# Patient Record
Sex: FEMALE
[De-identification: names, ages, dates, MRNs, and addresses within clinical notes are randomized per-mention and may not be internally consistent; named-entity substitution may affect disease eponyms.]

---

## 2017-06-27 ENCOUNTER — HOSPITAL ENCOUNTER (EMERGENCY)
Dept: HOSPITAL 14 - H.ER | Age: 37
Discharge: LEFT BEFORE DISCHARGE | End: 2017-06-27
Payer: SELF-PAY

## 2017-06-27 VITALS
TEMPERATURE: 98.2 F | OXYGEN SATURATION: 100 % | HEART RATE: 91 BPM | RESPIRATION RATE: 16 BRPM | SYSTOLIC BLOOD PRESSURE: 122 MMHG | DIASTOLIC BLOOD PRESSURE: 80 MMHG

## 2017-06-27 DIAGNOSIS — Z02.89: Primary | ICD-10-CM

## 2017-06-27 NOTE — ED PDOC
HPI: Female  Pain


Time Seen by Provider: 17 15:30


Chief Complaint (Nursing): Female Genitourinary


Chief Complaint (Provider): VAGINAL BLEEDING/ABDOMINAL CRAMPY PAIN


History Per: Patient (38 Y/O FEMALE  HERE FOR EVALUATION OF LOWER ABDOMINAL 

CRAMPY PAIN ASSOCIATED WITH SMALL AMOUNT OF VAGINAL BLEEDING.  DENIES ANY 

DYSURIA/URINARY FREQUENCY/ETC.  HAS NOTED POSITIVE URINE PREGNANCY TEST THIS 

WEEK.)





Past Medical History


Reviewed: Historical Data, Nursing Documentation, Vital Signs


Vital Signs: 





 Last Vital Signs











Temp  98.2 F   17 14:54


 


Pulse  91 H  17 14:54


 


Resp  16   17 14:54


 


BP  122/80   17 14:54


 


Pulse Ox  100   17 14:54














- Family History


Family History: States: No Known Family Hx





- Home Medications


Home Medications: 


 Ambulatory Orders











 Medication  Instructions  Recorded


 


Acetaminophen with Codeine 1 tab PO Q6 PRN #15 tab 14





[Tylenol with Codeine No. 3 300  





mg-30 mg]  














- Allergies


Allergies/Adverse Reactions: 


 Allergies











Allergy/AdvReac Type Severity Reaction Status Date / Time


 


sulfamethoxazole AdvReac  HEADACHE Verified 17 14:57





[From Bactrim]     


 


trimethoprim [From Bactrim] AdvReac  HEADACHE Verified 17 14:57














Review of Systems


ROS Statement: Except As Marked, All Systems Reviewed And Found Negative


Gastrointestinal: Positive for: Abdominal Pain


Genitourinary Female: Positive for: Vaginal Bleeding





Physical Exam





- Reviewed


Nursing Documentation Reviewed: Yes


Vital Signs Reviewed: Yes





- Physical Exam


Appears: Positive for: Well, Non-toxic, No Acute Distress


Head Exam: Positive for: ATRAUMATIC, NORMAL INSPECTION, NORMOCEPHALIC


Skin: Positive for: Normal Color, Warm, DRY


Eye Exam: Positive for: EOMI, Normal appearance, PERRL


ENT: Positive for: Normal ENT Inspection


Neck: Positive for: Normal, Painless ROM


Cardiovascular/Chest: Positive for: Regular Rate, Rhythm


Respiratory: Positive for: CNT, Normal Breath Sounds


Gastrointestinal/Abdominal: Positive for: Normal Exam, Bowel Sounds, Soft


Back: Positive for: Normal Inspection


Extremity: Positive for: Normal ROM


Neurologic/Psych: Positive for: Alert, Oriented





- ECG


O2 Sat by Pulse Oximetry: 100





Disposition





- Clinical Impression


Clinical Impression: 


 Genitourinary Pain








- Patient ED Disposition


Is Patient to be Admitted: No





- Disposition


Disposition: Left W/O Treatment


Disposition Time: 15:47


Condition: FAIR

## 2017-07-20 ENCOUNTER — HOSPITAL ENCOUNTER (EMERGENCY)
Dept: HOSPITAL 14 - H.ER | Age: 37
Discharge: HOME | End: 2017-07-20
Payer: COMMERCIAL

## 2017-07-20 VITALS
OXYGEN SATURATION: 98 % | RESPIRATION RATE: 17 BRPM | SYSTOLIC BLOOD PRESSURE: 141 MMHG | DIASTOLIC BLOOD PRESSURE: 75 MMHG | HEART RATE: 108 BPM | TEMPERATURE: 99.8 F

## 2017-07-20 DIAGNOSIS — L03.019: Primary | ICD-10-CM

## 2017-07-20 LAB
ALBUMIN SERPL-MCNC: 5 G/DL (ref 3.5–5)
ALBUMIN/GLOB SERPL: 1.9 {RATIO} (ref 1–2.1)
ALT SERPL-CCNC: 52 U/L (ref 9–52)
AST SERPL-CCNC: 27 U/L (ref 14–36)
BASOPHILS # BLD AUTO: 0 K/UL (ref 0–0.2)
BASOPHILS NFR BLD: 0.3 % (ref 0–2)
BUN SERPL-MCNC: 15 MG/DL (ref 7–17)
CALCIUM SERPL-MCNC: 9.4 MG/DL (ref 8.4–10.2)
EOSINOPHIL # BLD AUTO: 0.1 K/UL (ref 0–0.7)
EOSINOPHIL NFR BLD: 1.9 % (ref 0–4)
ERYTHROCYTE [DISTWIDTH] IN BLOOD BY AUTOMATED COUNT: 13.2 % (ref 11.5–14.5)
GFR NON-AFRICAN AMERICAN: > 60
HGB BLD-MCNC: 15 G/DL (ref 12–16)
LYMPHOCYTES # BLD AUTO: 3 K/UL (ref 1–4.3)
LYMPHOCYTES NFR BLD AUTO: 38.3 % (ref 20–40)
MCH RBC QN AUTO: 29.7 PG (ref 27–31)
MCHC RBC AUTO-ENTMCNC: 33.8 G/DL (ref 33–37)
MCV RBC AUTO: 88 FL (ref 81–99)
MONOCYTES # BLD: 0.5 K/UL (ref 0–0.8)
MONOCYTES NFR BLD: 6.9 % (ref 0–10)
NEUTROPHILS # BLD: 4.1 K/UL (ref 1.8–7)
NEUTROPHILS NFR BLD AUTO: 52.6 % (ref 50–75)
NRBC BLD AUTO-RTO: 0.1 % (ref 0–0)
PLATELET # BLD: 289 K/UL (ref 130–400)
PMV BLD AUTO: 7.7 FL (ref 7.2–11.7)
RBC # BLD AUTO: 5.03 MIL/UL (ref 3.8–5.2)
WBC # BLD AUTO: 7.9 K/UL (ref 4.8–10.8)

## 2017-07-20 NOTE — ED PDOC
Upper Extremity Pain/Injury


Time Seen by Provider: 07/20/17 17:34


Chief Complaint (Nursing): Finger,Hand,&Wrist


Chief Complaint (Provider): finger injury


History Per: Patient


History/Exam Limitations: no limitations


Additional Complaint(s): 





38yo F in ED for eval of right 2nd DIP swelling, pain, warmth and dec ROM after 

getting a paper cut 4d ago. was seen at an urgent care 3d ago and has noted 

worsening symptoms increasing swelling to digits without fever chills nausea or 

vomiting. 





Past Medical History


Reviewed: Historical Data, Nursing Documentation, Vital Signs


Vital Signs: 


 Last Vital Signs











Temp  99.8 F H  07/20/17 17:27


 


Pulse  108 H  07/20/17 17:27


 


Resp  17   07/20/17 17:27


 


BP  141/75   07/20/17 17:27


 


Pulse Ox  98   07/20/17 17:27














- Medical History


PMH: No Chronic Diseases





- Family History


Family History: States: No Known Family Hx





- Home Medications


Home Medications: 


 Ambulatory Orders











 Medication  Instructions  Recorded


 


Acetaminophen with Codeine 1 tab PO Q6 PRN #15 tab 09/12/14





[Tylenol with Codeine No. 3 300  





mg-30 mg]  


 


Clindamycin [Cleocin] 300 mg PO TID #30 cap 07/20/17














- Allergies


Allergies/Adverse Reactions: 


 Allergies











Allergy/AdvReac Type Severity Reaction Status Date / Time


 


sulfamethoxazole AdvReac  HEADACHE Verified 06/27/17 14:57





[From Bactrim]     


 


trimethoprim [From Bactrim] AdvReac  HEADACHE Verified 06/27/17 14:57














Review of Systems


ROS Statement: Except As Marked, All Systems Reviewed And Found Negative


Musculoskeletal: Positive for: Hand Pain





Physical Exam





- Reviewed


Nursing Documentation Reviewed: Yes


Vital Signs Reviewed: Yes





- Physical Exam


Appears: Positive for: Well, Non-toxic, No Acute Distress


Skin: Positive for: Normal Color, Warm, DRY


Cardiovascular/Chest: Positive for: Regular Rate, Rhythm


Respiratory: Positive for: CNT, Normal Breath Sounds


Extremity: Positive for: Other (right hand: second digit swelling, dec ROM 

swelling wamrth tenderness good pulses)


Neurologic/Psych: Positive for: Alert, Oriented





- Laboratory Results


Result Diagrams: 


 07/20/17 18:45





 07/20/17 18:45





- ECG


O2 Sat by Pulse Oximetry: 98





- Radiology


X-Ray: Interpreted by Me


X-Ray Interpretation: No Acute Disease





Medical Decision Making


Medical Decision Making: 





pt will get abx, xray and labs


Pt without elevated WBC pt will bd d/c with cleocin advised to continue keflex 

and f.u in 3d with pmd. 


stable VS





Disposition





- Clinical Impression


Clinical Impression: 


 Cellulitis








- Patient ED Disposition


Is Patient to be Admitted: No


Counseled Patient/Family Regarding: Studies Performed, Diagnosis, Need For 

Followup, Rx Given





- Disposition


Disposition: Routine/Home


Disposition Time: 20:25


Condition: STABLE


Prescriptions: 


Clindamycin [Cleocin] 300 mg PO TID #30 cap


Instructions:  Cellulitis (ED)

## 2017-07-21 NOTE — RAD
PROCEDURE:  Right Hand Radiographs.



HISTORY:

2n digit pain  



COMPARISON:

None available.



FINDINGS:



BONES:

Faint oblique lucency at the base of the 2nd metatarsal extending to 

the cortex ; favored to represent vascular groove however 

nondisplaced fracture cannot be entirely excluded.  Correlate with 

physical exam. 



JOINTS:

No dislocation. 



SOFT TISSUES:

Mild soft tissue swelling. No evidence of radiopaque foreign body. 



OTHER FINDINGS:

None.



IMPRESSION:

Faint oblique lucency at the base of the 2nd metatarsal extending to 

the cortex ; favored to represent vascular groove however 

nondisplaced fracture cannot be entirely excluded.  Correlate with 

physical exam. 



Mild soft tissue swelling. 



Study has been marked for PA review.

## 2018-10-01 ENCOUNTER — HOSPITAL ENCOUNTER (EMERGENCY)
Dept: HOSPITAL 14 - H.ER | Age: 38
Discharge: HOME | End: 2018-10-01
Payer: COMMERCIAL

## 2018-10-01 VITALS
SYSTOLIC BLOOD PRESSURE: 116 MMHG | RESPIRATION RATE: 18 BRPM | HEART RATE: 75 BPM | DIASTOLIC BLOOD PRESSURE: 81 MMHG | TEMPERATURE: 98.4 F | OXYGEN SATURATION: 98 %
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
## 2018-10-01 DIAGNOSIS — S90.32XA: Primary | ICD-10-CM

## 2018-10-01 NOTE — ED PDOC
Lower Extremity Pain/Injury


Chief Complaint (Provider): Lower Extremity Problem/Injury


History Per: Patient


History/Exam Limitations: no limitations


Onset/Duration Of Symptoms: Hrs (last night)


Current Symptoms Are (Timing): Still Present


Additional Complaint(s): 





38 year old female presents to the ED complaining of left foot pain since last 

night.  Patient reports she was walking down steps in wedges when she tripped 

and fell.  The wedge broke off and her foot landed on it.  No medications were 

taken at home.  Patient denies numbness, tingling, or bruising.  Patient 

declined pain medications in the ED. 





PMD: none





<Eve Joyner - Last Filed: 10/01/18 18:57>





<Lin Baird - Last Filed: 10/02/18 16:03>


Time Seen by Provider: 10/01/18 17:19


Chief Complaint (Nursing): Lower Extremity Problem/Injury





Past Medical History


Reviewed: Historical Data, Nursing Documentation, Vital Signs


Vital Signs: 





                                Last Vital Signs











Temp  98.4 F   10/01/18 17:15


 


Pulse  75   10/01/18 17:15


 


Resp  18   10/01/18 17:15


 


BP  116/81   10/01/18 17:15


 


Pulse Ox  98   10/01/18 17:15














- Medical History


PMH: No Chronic Diseases


   Denies: Chronic Kidney Disease





- Surgical History


Surgical History: No Surg Hx





- Family History


Family History: States: Unknown Family Hx





<Eve Joyner - Last Filed: 10/01/18 18:57>


Vital Signs: 





                                Last Vital Signs











Temp  98.4 F   10/01/18 17:15


 


Pulse  75   10/01/18 17:15


 


Resp  18   10/01/18 17:15


 


BP  116/81   10/01/18 17:15


 


Pulse Ox  98   10/01/18 18:58














<Lin Baird - Last Filed: 10/02/18 16:03>





- Home Medications


Home Medications: 


                                Ambulatory Orders











 Medication  Instructions  Recorded


 


Acetaminophen with Codeine 1 tab PO Q6 PRN #15 tab 09/12/14





[Tylenol with Codeine No. 3 300  





mg-30 mg]  


 


Clindamycin [Cleocin] 300 mg PO TID #30 cap 07/20/17














- Allergies


Allergies/Adverse Reactions: 


                                    Allergies











Allergy/AdvReac Type Severity Reaction Status Date / Time


 


sulfamethoxazole AdvReac  HEADACHE Verified 10/01/18 17:15





[From Bactrim]     


 


trimethoprim [From Bactrim] AdvReac  HEADACHE Verified 10/01/18 17:15














Review of Systems


ROS Statement: Except As Marked, All Systems Reviewed And Found Negative


Musculoskeletal: Positive for: Foot Pain (Left)


Skin: Negative for: Bruising


Neurological: Negative for: Numbness (or tingling)





<Eve Joyner - Last Filed: 10/01/18 18:57>





Physical Exam





- Reviewed


Nursing Documentation Reviewed: Yes


Vital Signs Reviewed: Yes





- Physical Exam


Appears: Positive for: Non-toxic, No Acute Distress


Head Exam: Positive for: ATRAUMATIC, NORMAL INSPECTION


Skin: Positive for: Normal Color, Warm, Dry


Eye Exam: Positive for: Normal appearance


Neck: Positive for: Normal, Painless ROM


Cardiovascular/Chest: Positive for: Regular Rate, Rhythm.  Negative for: Murmur


Respiratory: Positive for: Normal Breath Sounds.  Negative for: Wheezing, 

Respiratory Distress


Pulses-Dorsalis Pedis (L): 2+


Pulses-Dorsalis Pedis (R): 2+


Pulses-Post. Tibialis (L): 2+


Pulses-Post. Tibialis (R): 2+


Extremity: Positive for: Normal ROM, Tenderness (planar midfoot).  Negative for:

Deformity (bony), Other (Ecchymosis or erythema)


Neurologic/Psych: Positive for: Alert, Oriented, Other (sensation intact).  

Negative for: Motor/Sensory Deficits





<Eve Joyner - Last Filed: 10/01/18 18:57>





- ECG


O2 Sat by Pulse Oximetry: 98 (RA)


Pulse Ox Interpretation: Normal





<Eve Joyner - Last Filed: 10/01/18 18:57>





Medical Decision Making


Medical Decision Making: 





Initial Impression: Left foot pain





Initial Plan: 


--Left foot X-ray





No acute fracture or dislocation. 


 

--------------------------------------------------------------------------------


-----------------


Scribe Attestation:


Documented by Miguel Lilly acting as a scribe for Eve GUTIERREZ.





Provider Scribe Attestation:


All medical record entries made by the Scribe were at my direction and p

ersonally dictated by me. I have reviewed the chart and agree that the record 

accurately reflects my personal performance of the history, physical exam, 

medical decision making, and the department course for this patient. I have also

personally directed, reviewed, and agree with the discharge instructions and 

disposition.





<Eve Joyner - Last Filed: 10/01/18 18:57>





Disposition





- Patient ED Disposition


Is Patient to be Admitted: No


Counseled Patient/Family Regarding: Diagnosis, Need For Followup





- Disposition


Disposition: Routine/Home


Disposition Time: 18:58





<Eve Joyner - Last Filed: 10/01/18 18:57>





<Lin Baird - Last Filed: 10/02/18 16:03>





- Clinical Impression


Clinical Impression: 


 Foot contusion








- Disposition


Referrals: 


Morris Pacheco MD [Staff Provider] - 


Condition: STABLE


Instructions:  Contusion (DC)


Forms:  [facility] (English)





- PA / NP / Resident Statement


MD/ has reviewed & agrees with the documentation as recorded.





<Lin Baird - Last Filed: 10/02/18 16:03>

## 2018-10-02 NOTE — RAD
Date of service: 



10/01/2018



PROCEDURE:  Left Foot Radiographs.



HISTORY:

Posttraumatic pain



COMPARISON:

09/12/2014



FINDINGS:



BONES:

Normal. No fracture. 



JOINTS:

Normal. 



SOFT TISSUES:

Normal. 



OTHER FINDINGS:

None.



IMPRESSION:

Normal left foot radiographs.No significant interval change compared 

to the prior examination(s).